# Patient Record
Sex: MALE | Race: WHITE
[De-identification: names, ages, dates, MRNs, and addresses within clinical notes are randomized per-mention and may not be internally consistent; named-entity substitution may affect disease eponyms.]

---

## 2018-10-30 ENCOUNTER — HOSPITAL ENCOUNTER (INPATIENT)
Dept: HOSPITAL 80 - FED | Age: 33
LOS: 6 days | Discharge: HOME | DRG: 897 | End: 2018-11-05
Attending: PSYCHIATRY & NEUROLOGY | Admitting: PSYCHIATRY & NEUROLOGY
Payer: COMMERCIAL

## 2018-10-30 DIAGNOSIS — F17.210: ICD-10-CM

## 2018-10-30 DIAGNOSIS — F15.950: ICD-10-CM

## 2018-10-30 DIAGNOSIS — F20.9: ICD-10-CM

## 2018-10-30 DIAGNOSIS — F60.2: ICD-10-CM

## 2018-10-30 DIAGNOSIS — F15.951: Primary | ICD-10-CM

## 2018-10-30 LAB — PLATELET # BLD: 232 10^3/UL (ref 150–400)

## 2018-10-30 PROCEDURE — G0480 DRUG TEST DEF 1-7 CLASSES: HCPCS
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
## 2018-10-31 NOTE — ASMTLCPROG
Notes

 

Note:                         

Notes:

CIS report and [place] history faxed to [facility]. 

Diagnosis: 

Other Specified Schizophrenia Spectrum and Other Psychotic Disorder  298.8  (F28)

Delusional Disorder, Persecutory type  297.1 (F22)

Alcohol Use Disorder, moderate  303.90  (F10.20)

Amphetamine-Type Substance Use Disorder, moderate  304.40  (F15.20)

Antisocial Personality Disorder  301.7 (F60.2) 

 

Date Signed:  10/31/2018 09:23 AM

Electronically Signed By:Ulises Rendon

## 2018-10-31 NOTE — ASMTTCLDSP
[facility] Discharge Disposition

 

Disposition:                  Answers:  Admit                                 

Disposition Notes:            

Notes:

Admit [facility].

Discharge                     

Concerns/Recommendations:     

Notes:

In consultation with [facility] ED physician, Tucker Torres MD and on-call psychiatrist, Christopher Don MD, both concurred that pt appears to meet 27-65 criteria requiring psychiatric 

hospitalization as pt appears to be at risk of harm to others and gravely disabled due to a mental 

illness condition. Pt was given but did not sign the prohibited belongings checklist.

Was patient given the         Answers:  Yes                                   

Inpatient Behavioral                                                          

Health Prohibited                                                             

Belongings List while in                                                      

the ED?                                                                       

For inpatient                 Christopher Don MD

admission, the following      

psychiatrist agreed to        

accept patient for            

admission to Behavioral       

Health ([facility]):              

Type of Hold:                 Answers:  M1/72-hour Hold                       

Hold initiated by:            Answers:  Other                         Notes:  [facility] clinician

 

Date Signed:  10/31/2018 09:21 AM

Electronically Signed By:Ulises Rendon
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
## 2018-11-01 RX ADMIN — OLANZAPINE SCH MG: 5 TABLET, ORALLY DISINTEGRATING ORAL at 21:40

## 2018-11-01 NOTE — PDMN
Medical Necessity


Medical necessity: Pt meets inpt criteria per MD order and MCG B-[id number], 

Schizophrenia Spectrum Disorders, Adult: Inpatient Care. Pt admitted on M1 Hold 

due to being gravely disabled, admitted w/unspecified schizophrenia or other 

psychotic disorder, antisocial personality disorder, stimulant use disorder-

severe, stimulant induced psychotic disorder. Pt requiring inpt psychiatric 

hospitalization for management of above.

## 2018-11-01 NOTE — SOAPPROG
SOAP Progress Note


Assessment/Plan: 


Assessment:





Unspecified psychosis, Stimulant Use Disorder, Stimulant Induced Psychosis, 

Stimulant Use Withdrawal, Antisocial Personality Disorder, Hx of medication non-

adherence.  Improvement noted. (see subjective/objective note).  Consider 

discharge tomorrow (Friday). 








Plan:





1. Psychotropic medications: After reviewing options, risks, and benefits 

patient agrees to continue current medications with following changes: Zyprexa 

Zydis 5 mg po QHS.  No other medication changes at this time as more time is 

needed to determine ongoing tolerability and efficacy.  Plan is to continue to 

observe patient for response and side effects from medications, and ongoing 

monitoring and evaluation.  


2. Review with patient informed consent and recommendations for psychotropic 

medication treatment listed below


3. Labs: no additional labs at this time


4. Therapy: continue milieu and group therapy  


5. Further investigation including gathering information from patients 

relatives and review of past case records to inform treatment plan.


6. Safety/Wellness plan and follow-up outpatient appointments to be established 

prior to discharge.  Next steps are for patient to meet with care manager to 

plan a safe discharge plan and establish outpatient services for ongoing 

treatment.  


7. Confer with inpatient treatment team regarding treatment plan.  


8. Psychosocial stressors addressed through case management


9. Legal status: M1


10. Consider discharge on Friday if patient is in stable condition, safe, and 

has a safe discharge plan.   


11. Substance abuse interventions: stimulant abuse








PSYCHOTROPIC MEDICATION TREATMENT INFORMED CONSENT and RECOMMENDATIONS: Review 

nature of condition, diagnosis, and prognosis.  Review nature and purpose of 

psychotropic medication treatment.  Review type of psychotropic medications 

being ordered.  Review risk and benefits of psychotropic medication treatment.  

Review probable length of time patient will need to take medications.  Review 

risk and benefits of not undergoing psychotropic medication treatment.  Review 

alternative treatments to psychotropic medications.  Review psychotropic 

medications contraindications, drug-drug interactions, side effects, and 

importance of reporting any side effects to a psychiatric provider or nurse 

during inpatient hospitalization, and upon discharge to patients psychiatric 

outpatient provider, primary care provider, or other health care professional.  

Review importance of asking a nurse, psychiatric provider, or primary care 

provider any questions or problems concerning the psychotropic medications.  

Verify patient understands the information that has been provided, and 

understands, accepts, and agrees to psychotropic medications.  





Review patients safety plan and importance of patient to report to staff while 

hospitalized if patient is ever a danger to self/others, or unable to care for 

self, and upon discharge, the importance for patient to contact [facility] or [facility], or go to the nearest emergency room, if patient is ever a 

danger to self/others, or unable to care for self.  Recommend that upon 

discharge patient establish medication management treatment with a psychiatric 

provider, establishes routine therapy appointments, and follow-up with primary 

care provider.  Verify patient understands and agrees to these recommendations.





11/01/18 11:00





Subjective: 


Following up with patient for evaluation of psychosis and safety.  Patient 

reports, "Feeling better since been here."  Patient expresses the following 

psychiatric symptoms none, reports feeling "really tired."  Patient reports no 

paranoia, no A/V hallucinations.  Patient reports taking "several Adderall days 

before" admission.  Patient reports taking medications as prescribed, and 

describes response to medications as good.  Patient reports not taking Zyprexa 

10 mg po QHS as prescribed for several months due to over sedation side effect.

  Patient requests Zyprexa Zydis 5 mg po QHS due to being over sedated upon 

awakening at 10 mg po QHS.   Patient does not report undesirable side effects 

from the medications, and agrees to continue current medications.  Patient 

describes getting 8 hours of sleep.  Patient reports he is unsure if he will 

return to his sister's home after discharging from hospital, and reports he 

plans to contact her today and make plans for discharge.  





Objective: 





 Vital Signs











Temp Pulse Resp BP Pulse Ox


 


 36.9 C   79   16   142/89 H  95 


 


 11/01/18 06:00  11/01/18 06:00  11/01/18 06:00  11/01/18 06:00  11/01/18 06:00








NURSING REPORT: Consulted with nursing for update on patients progress in 

treatment.  Nurses report patient is engaged in treatment, is not attending 

groups, slept 10 hours last night and slept majority of the day yesterday after 

admission; spends majority of the time in his room in bed sleeping; expresses 

the following psychiatric symptoms: moderate anxiety, exhibits the following 

psychiatric symptoms: withdrawn to room, flat affect; is eating all meals, is 

agreeable to medications and taking as prescribed with no report of side effects

, with no s/s of EPS/akathisia, and denies SI/HI, denies A/V hallucinations, 

and denies delusions.





CASE COORDINATOR UPDATE: establish appointments at [facility] for medication 

management and therapy.





TREATMENT TEAM: Patient met with treatment team to review treatment plan and 

goals for hospitalization.  Patient requests to discharge tomorrow.  Patient to 

contact family regarding place to stay after discharge.  Treatment team to 

follow-up with patient tomorrow regarding disposition after discharge.





MSE: The patient presents casually dressed and with good hygiene, and looks 

stated age.  Patient is sitting, posture is upright, and position is relaxed.  

Patient appears awake, alert, and responds appropriately and reasonably during 

interview.  Patient is engaged, relates well to interviewer, and emotional 

facial expression is appropriate to situation and changes appropriately with 

topic.  Patient is cooperative, makes comfortable eye contact, and movements 

are voluntary, deliberate, coordinated, and smooth and even with no 

inappropriate movements.  Patient makes laryngeal sounds effortlessly and 

shares conversation appropriately; pace of conversation is appropriate, and 

stream of talking is fluent; articulation is clear and understandable; word 

choice is effortless and appropriate for education level; completes sentences, 

occasionally pausing to think; rate and volume are appropriate for interview 

and setting.  Patient reports mood as euthymic.  Patients affect is stable with 

full variable range, congruent with mood, and appropriate to speech and 

circumstances.  Patient has linear and logical thinking, with no loose 

associations, tangential thought, thought blocking, concrete thinking, or any 

other signs of formal thought disorder.  Patient denies suicidal and homicidal 

ideation, and denies hallucinations and delusions.  Patient appears to be a 

reliable historian with sound judgement and good insight into current 

condition.  Patient has no apparent dysfunction in recent or remote memory noted

, and no evidence of gross cognitive dysfunction noted at any point during the 

interview.





SUBSTANCE ABUSE BRIEF INTERVENTION: Brief intervention regarding the risks of 

stimulant abuse is provided to patient with goal to reduce the risk of harm 

that could result from the continued abuse of stimulants, with the general aim 

to investigate the problem, raise awareness of problem, develop a solution with 

the patient, recommend a specific change or activity, and motivate the patient 

toward change.  Assess substance abuse behavior and give supportive advice 

about harm reduction, recommend a reduction in hazardous/at-risk consumption 

patterns, and facilitate referrals for additional specialized treatment with 

care coordinator.  Intermediate goal is for the patient to quit and engage in 

outpatient substance abuse treatment.  Intervention focus on intermediate goals 

to allow for more immediate success in the treatment process to keep the 

patient motivated.  Review following with patient: Stimulant abuse risks: Short-

term: insomnia, irritability, aggressive behavior, hallucinations, delusions, 

intellectual deficits, anxiety, depression, convulsions, damage to blood 

vessels in the brain causing strokes, high fevers, collapse of the circulatory 

system. Long-term: damage to nerve pathways, maybe irreversibly; 

overstimulation to dopamine impairing dopamine transport and reducing 

efficiency of dopamine receptors, the reward system becomes worn out, leading 

to inability to experience pleasure for years.  





OUTPATIENT SUBSTANCE ABUSE TREATMENT: Patient referred to outpatient provider 

and treatment for continued treatment related to substance abuse.











- Time Spent With Patient


Time Spent With Patient: 


30 minutes, met with patient individually and with patient and treatment team.








- Pending Discharge


Pending Discharge Within 24 Hours: Yes


Pending Discharge Within 48 Hours: No


Pending Discharge Date: 11/02/18


Pending Discharge Time: 11:00





ICD10 Worksheet


Patient Problems: 


 Problems











Problem Status Onset


 


Abrasion of right forearm, initial encounter Acute  


 


Acute psychosis Acute  


 


Antisocial personality disorder Acute  


 


Stimulant use disorder Acute  


 


Stimulant-induced psychotic disorder with delusions Acute  


 


Unspecified psychosis Acute

## 2018-11-01 NOTE — ASMTBHMTP
Master Treatment Plan

 

Master Treatment Plan         Answers:  Impaired Reality                      

for:                                                                          

Date:                         11/01/2018

Diagnosis on Admission:       Other Specified Schizophrenia Spectrum and Other 

                              Psychotic Disorder

Expected length of stay:      3-5 Days

Reason for admission:         

Notes:

Per [facility] Evaluation - Pt. is a 33 year old, ,  ([place] [facility]. Ct. was living with [facility] [place] and then [facility] until today but given his decompensation 


due to being off his antipsychotics for 2 months and Zoloft for a year, MO reported ct. is not 

welcome in SOC's or [facility] homes and is homeless. SOC and MO called [facility] requesting a mobile 

response today b/c ct has been off his antipsychotics for 2 months and Zoloft for a year and taking 


1/15th of the daily amount of Wellbutrin prescribed and ct is paranoia reporting AH to SOC, and 

threatening others with knives and hiding knives in furniture and carrying them with him. 

Patient's stated              

presenting problems:          

Notes:

Mom called crisis worked and I was put on a hold, then brought to the hospital.

Patient's goals for           

treatment:                    

Notes:

Try to start a schedule. Maybe read or exercise

Patient's strengths:          

Notes:

Don't know. Staying patient, staying calm

Identify supports outside     

of hospital:                  

Notes:

Don't really have one right now

Discharge criteria:           

Notes:

Psychotic symptoms will be reduced or eliminated with return to baseline functioning in 

affect, thinking, and behavior prior to discharge. 

Initial disposition           

plan/considerations:          

Notes:

Not sure where will be living

Master Treatment Plan Required Signatures

 

Psychiatrist signature:       Answers:  Psychiatrist: ______________________________

RN on-shift signature:        Answers:  RN: ____________________________________

Patient signature:            Answers:  Patient: ____________________________________

 

Date Signed:  11/01/2018 09:22 AM

Electronically Signed By:Tiffanie Cheney

## 2018-11-01 NOTE — ASMTCMCOM
CM Note

 

CM Note                       

Notes:

Pt. and CC completed MTP and placed in chart. 



Pt. reports no current legal issues. Pt. reports drinking alcohol three times a week, having two 

drinks per sitting. Pt. reports smoking cigarettes but is unsure if he wants to quit smoking at 

this time. Pt. denied all other substance use. Pt. reports this being his first mental health 

hospitalization. Pt. denied SI, HI, AVH and paranoia. 



Pt. presents as alert, calm, good eye contact, somewhat passive, and with a mostly friendly 

demeanor. Staff report pt. sleeping throughout most of yesterday and the night. Staff report pt. is 


medication complaint. 

 

Date Signed:  11/01/2018 12:56 PM

Electronically Signed By:Tiffanie Cheney

## 2018-11-02 RX ADMIN — BACITRACIN ZINC SCH APP: 500 OINTMENT TOPICAL at 21:11

## 2018-11-02 RX ADMIN — OLANZAPINE SCH MG: 5 TABLET, ORALLY DISINTEGRATING ORAL at 20:37

## 2018-11-02 RX ADMIN — BACITRACIN ZINC SCH APP: 500 OINTMENT TOPICAL at 12:37

## 2018-11-02 NOTE — SOAPPROG
SOAP Progress Note


Assessment/Plan: 


Assessment:





Unspecified psychosis, Stimulant Use Disorder, Stimulant Induced Psychosis, 

Antisocial Personality Disorder, Hx of medication non-adherence.  

Decompensation noted; patient reports paranoid delusions regarding people being 

after him (see subjective/objective note).  Patient requires continued 

inpatient care because of current paranoia, and requires inpatient level of 

care to stabilize in order to no longer be gravely disabled due to mental 

illness or danger to others due to paranoid delusions and his expressed need to 

carry knife for protection due to people being after him.  Patient could 

benefit from continued inpatient hospitalization for crisis stabilization, 

safety, and medication evaluation.  








Plan:





1. Psychotropic medications: After reviewing options, risks, and benefits 

patient agrees to continue current medications: Zyprexa Zydis 5 mg po QHS.  No 

other medication changes at this time as more time is needed to determine 

ongoing tolerability and efficacy.  Plan is to continue to observe patient for 

response and side effects from medications, and ongoing monitoring and 

evaluation.  


2. Review with patient informed consent and recommendations for psychotropic 

medication treatment listed below


3. Labs: no additional labs at this time


4. Therapy: continue milieu and group therapy  


5. Further investigation including gathering information from patients 

relatives and review of past case records to inform treatment plan.


6. Safety/Wellness plan and follow-up outpatient appointments to be established 

prior to discharge.  Next steps are for patient to meet with care manager to 

plan a safe discharge plan and establish outpatient services for ongoing 

treatment.  


7. Confer with inpatient treatment team regarding treatment plan.  


8. Psychosocial stressors addressed through case management


9. Legal status: VOL


10. Consider discharge on Monday if patient is in stable condition, safe, and 

has a safe discharge plan.   


11. Substance abuse interventions: stimulant abuse








PSYCHOTROPIC MEDICATION TREATMENT INFORMED CONSENT and RECOMMENDATIONS: Review 

nature of condition, diagnosis, and prognosis.  Review nature and purpose of 

psychotropic medication treatment.  Review type of psychotropic medications 

being ordered.  Review risk and benefits of psychotropic medication treatment.  

Review probable length of time patient will need to take medications.  Review 

risk and benefits of not undergoing psychotropic medication treatment.  Review 

alternative treatments to psychotropic medications.  Review psychotropic 

medications contraindications, drug-drug interactions, side effects, and 

importance of reporting any side effects to a psychiatric provider or nurse 

during inpatient hospitalization, and upon discharge to patients psychiatric 

outpatient provider, primary care provider, or other health care professional.  

Review importance of asking a nurse, psychiatric provider, or primary care 

provider any questions or problems concerning the psychotropic medications.  

Verify patient understands the information that has been provided, and 

understands, accepts, and agrees to psychotropic medications.  





Review patients safety plan and importance of patient to report to staff while 

hospitalized if patient is ever a danger to self/others, or unable to care for 

self, and upon discharge, the importance for patient to contact [facility] or [facility], or go to the nearest emergency room, if patient is ever a 

danger to self/others, or unable to care for self.  Recommend that upon 

discharge patient establish medication management treatment with a psychiatric 

provider, establishes routine therapy appointments, and follow-up with primary 

care provider.  Verify patient understands and agrees to these recommendations.





11/02/18 15:29





Subjective: 


Following up with patient for evaluation of psychosis and safety.  Patient 

reports, "Continue to feel better, not as sedated in the morning with lower 

dose of medication before bed."  Patient expresses the following psychiatric 

symptoms paranoid delusions.  Patient reports paranoia, "People may be after me

, that is why I was carrying knife around, for protection", reports no A/V 

hallucinations.  Patient reports feeling safe here.  Patient does not report 

undesirable side effects from the medications, and agrees to continue current 

medications.  Patient describes getting 8 hours of sleep.  Patient reports plan 

to return to his mothers home on Monday, and reports his mother agrees with 

this plan.  Patient expresses that he is not interested in [facility], and reports he 

will take medications as prescribed after discharging from hospital.   





Objective: 





 Vital Signs











Temp Pulse Resp BP Pulse Ox


 


 36.9 C   87   16   168/95 H  96 


 


 11/02/18 06:00  11/02/18 06:00  11/02/18 06:00  11/02/18 06:00  11/02/18 06:00








NURSING REPORT: Consulted with nursing for update on patients progress in 

treatment.  Nurses report patient is not engaged in treatment, is not attending 

groups, slept 10 hours last night and slept majority of the day yesterday after 

admission; spends majority of the time in his room in bed sleeping; expresses 

the following psychiatric symptoms: moderate anxiety, exhibits the following 

psychiatric symptoms: withdrawn to room, flat affect; is eating all meals, is 

agreeable to medications and taking as prescribed with no report of side effects

, with no s/s of EPS/akathisia, and denies SI/HI, denies A/V hallucinations, 

and reports paranoid delusions of people being after him prior to his admission 

and the need to carry a knife to protect himself prior to admission.





CASE COORDINATOR UPDATE: establish appointments at [facility] for medication 

management and therapy.





TREATMENT TEAM: Patient met with treatment team to review treatment plan and 

goals for hospitalization.  Patient requests to discharge Monday, reports he 

feels like he needs a few more days to stabilize.  Patient reports to treatment 

team he felt as though he needed to carry knife for protection due to being 

concerned people were after him prior to his admission.  Patient reports he is 

unsure if people are still after him.





FAMILY MEETING: Patient mother reports concern for patient returning home too 

early and not being stable on medications.  She agrees to patient returning 

home on Monday if patient continues to be adherent to medications, is stable, 

safe, and has a safe discharge plan with follow-up appointments.





MSE: The patient presents casually dressed and with good hygiene, and looks 

stated age.  Patient is sitting, posture is upright, and position is relaxed.  

Patient appears awake, alert, and responds appropriately and reasonably during 

interview.  Patient is engaged, relates well to interviewer, and emotional 

facial expression is appropriate to situation and changes appropriately with 

topic.  Patient is cooperative, makes comfortable eye contact, and movements 

are voluntary, deliberate, coordinated, and smooth and even with no 

inappropriate movements.  Patient makes laryngeal sounds effortlessly and 

shares conversation appropriately; pace of conversation is appropriate, and 

stream of talking is fluent; articulation is clear and understandable; word 

choice is effortless and appropriate for education level; completes sentences, 

occasionally pausing to think; rate and volume are appropriate for interview 

and setting.  Patient reports mood as euthymic.  Patients affect is stable with 

full variable range, congruent with mood, and appropriate to speech and 

circumstances.  Patient has linear and logical thinking, with no loose 

associations, tangential thought, thought blocking, concrete thinking, or any 

other signs of formal thought disorder.  Patient denies suicidal and homicidal 

ideation, and denies hallucinations; patient reports paranoid delusions 

regarding people being after him.  Patient appears to be a poor historian with 

poor judgement and poor insight into current condition.  Patient has no 

apparent dysfunction in recent or remote memory noted, and no evidence of gross 

cognitive dysfunction noted at any point during the interview.





SUBSTANCE ABUSE BRIEF INTERVENTION: Brief intervention regarding the risks of 

stimulant abuse is provided to patient with goal to reduce the risk of harm 

that could result from the continued abuse of stimulants, with the general aim 

to investigate the problem, raise awareness of problem, develop a solution with 

the patient, recommend a specific change or activity, and motivate the patient 

toward change.  Assess substance abuse behavior and give supportive advice 

about harm reduction, recommend a reduction in hazardous/at-risk consumption 

patterns, and facilitate referrals for additional specialized treatment with 

care coordinator.  Intermediate goal is for the patient to quit and engage in 

outpatient substance abuse treatment.  Intervention focus on intermediate goals 

to allow for more immediate success in the treatment process to keep the 

patient motivated.  Review following with patient: Stimulant abuse risks: Short-

term: insomnia, irritability, aggressive behavior, hallucinations, delusions, 

intellectual deficits, anxiety, depression, convulsions, damage to blood 

vessels in the brain causing strokes, high fevers, collapse of the circulatory 

system. Long-term: damage to nerve pathways, maybe irreversibly; 

overstimulation to dopamine impairing dopamine transport and reducing 

efficiency of dopamine receptors, the reward system becomes worn out, leading 

to inability to experience pleasure for years.  





OUTPATIENT SUBSTANCE ABUSE TREATMENT: Patient referred to outpatient provider 

and treatment for continued treatment related to substance abuse.














- Time Spent With Patient


Time Spent With Patient: 


30 minutes, patient met with this NP individually and with NP and treatment 

team to review treatment plan and goals for hospitalization.








- Pending Discharge


Pending Discharge Within 24 Hours: No


Pending Discharge Within 48 Hours: No





ICD10 Worksheet


Patient Problems: 


 Problems











Problem Status Onset


 


Abrasion of right forearm, initial encounter Acute  


 


Acute psychosis Acute  


 


Antisocial personality disorder Acute  


 


Stimulant use disorder Acute  


 


Stimulant-induced psychotic disorder with delusions Acute  


 


Unspecified psychosis Acute

## 2018-11-02 NOTE — ASMTCMCOM
CM Note

 

CM Note                       

Notes:

Pt appears to be close to baseline denies si/hi ah/vh stabilizing has some anxiety in judgement and 


insight.  Agreed to sign SIVAKUMAR with family in tx planning meeting now denies.  Left SIVAKUMAR fo rhim to 

think about over the weekend and sign.  He notes sleeping better with medication assistance.  He 

appears aaox4 but presents with some noticeable anxiety regarding family contact. [facility]

 

Date Signed:  11/02/2018 12:31 PM

Electronically Signed By:Leona Wiggins

## 2018-11-03 RX ADMIN — BACITRACIN ZINC SCH APP: 500 OINTMENT TOPICAL at 09:01

## 2018-11-03 RX ADMIN — OLANZAPINE SCH MG: 5 TABLET, ORALLY DISINTEGRATING ORAL at 20:01

## 2018-11-03 RX ADMIN — DOXYCYCLINE HYCLATE SCH MG: 100 CAPSULE, GELATIN COATED ORAL at 09:01

## 2018-11-03 RX ADMIN — BACITRACIN ZINC SCH APP: 500 OINTMENT TOPICAL at 20:01
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
## 2018-11-04 RX ADMIN — BACITRACIN ZINC SCH APP: 500 OINTMENT TOPICAL at 20:11

## 2018-11-04 RX ADMIN — DOXYCYCLINE HYCLATE SCH MG: 100 CAPSULE, GELATIN COATED ORAL at 09:08

## 2018-11-04 RX ADMIN — OLANZAPINE SCH MG: 5 TABLET, ORALLY DISINTEGRATING ORAL at 20:11

## 2018-11-04 RX ADMIN — BACITRACIN ZINC SCH APP: 500 OINTMENT TOPICAL at 09:08
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
## 2018-11-05 VITALS — DIASTOLIC BLOOD PRESSURE: 87 MMHG | SYSTOLIC BLOOD PRESSURE: 130 MMHG

## 2018-11-05 RX ADMIN — DOXYCYCLINE HYCLATE SCH MG: 100 CAPSULE, GELATIN COATED ORAL at 09:02

## 2018-11-05 RX ADMIN — BACITRACIN ZINC SCH APP: 500 OINTMENT TOPICAL at 09:02

## 2018-11-05 NOTE — ASMTBHDC
Notes

 

Note:                         

Notes:

CC was able to confirm client's discharge follow up appts with [facility]:







Follow up with:



[facility]

[address]

[place] [address]

** Next appointment: Monday November 5th, 2018 (11/5/18) at 2:00pm, check in at 1:45pm at [address] 

[facility]



[facility] ([facility])

[facility]

[address] [place] [address]

Ph: [phone]

** Next Appointment: Wednesday Novermber 7th, 2018 (11/7/18) at 2:00pm, check in at 1:45pm with 

Dr. Tavera**

 

Date Signed:  11/05/2018 08:22 AM

Electronically Signed By:Kike Osuna

## 2018-11-05 NOTE — BDS
REASON FOR ADMISSION:  From the ED note dated 10/30/2018, the patient presented 
by EMS on an M1 hold by [facility] for being gravely disabled.  The 
patient has a history of schizophrenia, delusional disorder, antisocial 
personality disorder, and has been off his medications for approximately 2 
months.  The patient reported having paranoid delusions and carrying around a 
kitchen knife for protection.  His family was concerned regarding his paranoia 
and carrying the knife for protection.  Patient admitted to Adderall use over 
the past few days prior to presenting to the emergency department.  The patient 
denied methamphetamine use.  Patient was admitted involuntarily on an M1 hold 
due to being gravely disabled due to a mental illness.  The patient was 
admitted for safety, crisis stabilization, and medication management.



ADMITTING DIAGNOSES:  

1.  Unspecified psychosis.

2.  Stimulant-induced psychotic disorder with delusions.  

3.  Stimulant use disorder.  

4.  Acute psychosis.  

5.  Antisocial personality disorder.



ADMISSION PHYSICAL EXAM:  The patient was seen on 10/31/2018, for an internal 
medicine consultation note.  The consultation was for medical clearance for 
inpatient Behavioral Health stay.  The patient was medically cleared for 
inpatient psychiatric hospitalization, medications and procedures.  Dr. Hunt 
reported he saw no medical contraindications to the patient's continued stay on 
the inpatient behavioral health unit or to any psychiatric medications or 
procedures.  For further details, please refer to Internal Medicine 
consultation note dated 10/31/2018.



ADMISSION LABS:  CBC was completely within normal limits.  Serum chemistry 
revealed a low carbon dioxide at 20.  Otherwise, renal function and 
electrolytes were normal.  Hemoglobin A1c from 10/30/2018, was 5.4, within 
normal limits.  Liver function from 10/30/2018, within normal limits, except 
total protein was low at 5.9, and albumin was low at 3.4.  Lipid panel from 10/
30/2018, within normal limits, except cholesterol was low at 97. LDL 
cholesterol was low at 34.  Non-HDL cholesterol was low at 48.  LDL/HDL ratio 
was low at 0.69. Toxicology screen from 10/30/2018, non-negative for amphetamine
, negative for all other substances of abuse screened, and negative for ethyl 
alcohol.



MAJOR PROCEDURES OR TESTS:  None.



HOSPITAL COURSE:  The most prominent symptoms and behaviors while the patient 
was here were paranoia.  Upon admission, the patient continued to report that 
he felt like "people" were after him, and he would need to carry some type of 
weapon to protect himself.  The patient reported that he stopped taking Zyprexa 
10 mg p.o. q.h.s. due to feeling over-sedated in the morning and requested a 
lower dose. Treatment modalities utilized were milieu and group therapy.  
Zyprexa Zydis 5 mg p.o. q.h.s. was started to target psychosis symptoms, 
notably paranoia.  Medication was tolerated with no report of side effects and 
with good response.  The patient has improved considerably, with no signs of 
psychiatric symptoms and no psychiatric symptoms expressed at discharge.  The 
patient reports he no longer feels as though people are after him and states he 
no longer feels as though he needs to protect himself, such as he was before 
admission by carrying a knife.  The patient reports he has improved since 
admission, states to be in stable condition, feels safe to discharge, and he 
contracts for safety.  Patient's response to treatment was good.  There were no 
adverse or unexpected results of treatment.  The patient was safe throughout 
his stay, active in treatment, engaged in groups, and was appropriate with 
staff and other patients.  The patient met with the treatment team prior to 
discharge to assess readiness to discharge and review discharge plans.  The 
treatment team consensus is the patient is in stable condition, has a safe 
discharge plan, and is ready to discharge today.



CONDITION ON DISCHARGE: Patient is in stable condition and is no longer a 
danger to self or others, and is not gravely disabled due to mental illness.  
Patient is no longer in need of inpatient level of care, and can be safely and 
effectively treated within the community.  The patients level of risk at time 
of discharge is low.  MSE: The patient is casually dressed and with good hygiene
, and looks stated age.  Patient is sitting, posture is upright, and position 
is relaxed.  Patient appears awake, alert, and responds appropriately and 
reasonably during interview.  Patient is engaged, relates well to interviewer, 
and emotional facial expression is appropriate to situation and changes 
appropriately with topic.  Patient is cooperative, makes comfortable eye contact
, and movements are voluntary, deliberate, coordinated, and smooth and even 
with no inappropriate movements.  Patient makes laryngeal sounds effortlessly 
and shares conversation appropriately; pace of conversation is appropriate, and 
stream of talking is fluent; articulation is clear and understandable; word 
choice is effortless and appropriate for education level; completes sentences, 
occasionally pausing to think; rate and volume are appropriate for interview 
and setting.  Patient reports mood as euthymic.  Patients affect is stable with 
full variable range, congruent with mood, and appropriate to speech and 
circumstances.  Patient has linear and logical thinking, with no loose 
associations, tangential thought, thought blocking, concrete thinking, or any 
other signs of formal thought disorder.  Patient denies suicidal and homicidal 
ideation, and denies hallucinations and delusions.  Patient appears to be a 
reliable historian with sound judgement and good insight into current 
condition.  Patient has no apparent dysfunction in recent or remote memory noted
, and no evidence of gross cognitive dysfunction noted at any point during the 
interview. 



DISCHARGE DIAGNOSES:  

1.  Unspecified psychosis.

2.  Stimulant-induced psychotic disorder with delusions.  

3.  Stimulant use disorder.  

4.  Acute psychosis.  

5.  Antisocial personality disorder.



CURRENT MEDICATIONS:  After reviewing options, risks and benefits with the 
patient, the patient agrees to continue:

1.  Zyprexa 5 mg p.o. q.h.s. for psychosis.

2.  Bacitracin ointment 1 application topical twice a day for 7 days, or as 
indicated, for wound on forearm.

3.  Doxycycline hyclate 100 mg p.o. daily for 4 days.  



The patient requests prescriptions for these medications at time of discharge.  
Prescriptions are provided.  A prescription for Zyprexa 5 mg p.o. q.h.s. for 30 
days; bacitracin ointment, prescription is provided for 7 days; and doxycycline 
hyclate is provided for 4 days.  Prescriptions are reviewed with the patient at 
time of discharge to ensure accuracy and patient understanding.  Patient to 
[facility] for continued psychotropic medication 
evaluation and management, and patient is to follow up at [facility] for ongoing 
wound care and management of current medications for wound.



DISPOSITION:  Patient left hospital independently and voluntarily and plans to 
return home and stay with his mother.  His mother agrees with this plan and 
looks forward to the patient returning home.



FOLLOWUP:  Care coordinator reports the appropriate outpatient follow-up 
services have been established and outpatient appointments have been scheduled.
  The patient received written instructions with times and dates of outpatient 
follow-up appointments.  The following follow-up recommendations were provided 
to the patient at discharge: Continue psychotropic medications as prescribed 
and attend appointments as scheduled.  Report any side effects to a psychiatric 
outpatient provider, a primary care provider, or other health care 
professional.  Address any questions or problems concerning the psychotropic 
medications with a psychiatric outpatient provider, a primary care provider, or 
other health care professional.  Contact [facility] or [facility], or go 
to the nearest emergency room, if you are ever a danger to yourself/others, or 
unable to care for yourself.  As soon as possible, establish a routine 
medication management treatment with a psychiatric provider, establish routine 
therapy appointments, and follow-up with a primary care provider.  



SUBSTANCE ABUSE BRIEF INTERVENTION: Brief intervention regarding the risks of 
stimulant abuse is provided to patient with goal to reduce the risk of harm 
that could result from the continued abuse of stimulants, with the general aim 
to investigate the problem, raise awareness of problem, develop a solution with 
the patient, recommend a specific change or activity, and motivate the patient 
toward change.  Assess substance abuse behavior and give supportive advice 
about harm reduction, recommend a reduction in hazardous/at-risk consumption 
patterns, and facilitate referrals for additional specialized treatment with 
care coordinator.  Intermediate goal is for the patient to quit and engage in 
outpatient substance abuse treatment.  Intervention focus on intermediate goals 
to allow for more immediate success in the treatment process to keep the 
patient motivated.  Review following with patient: Stimulant abuse risks: Short-
term: insomnia, irritability, aggressive behavior, hallucinations, delusions, 
intellectual deficits, anxiety, depression, convulsions, damage to blood 
vessels in the brain causing strokes, high fevers, collapse of the circulatory 
system. Long-term: damage to nerve pathways, maybe irreversibly; 
overstimulation to dopamine impairing dopamine transport and reducing 
efficiency of dopamine receptors, the reward system becomes worn out, leading 
to inability to experience pleasure for years.  



OUTPATIENT SUBSTANCE ABUSE TREATMENT: Patient referred to outpatient provider 
and treatment for continued treatment related to substance abuse.



LEGAL COURSE:  The patient was admitted on an M1 hold for involuntary inpatient 
psychiatric hospitalization.  Patient discharged today independently and 
voluntarily.



ATTITUDE AT TIME OF DISCHARGE:  The patients attitude was positive at time of 
discharge, and patient reports looking forward to discharging today.  The 
patient reports he feels safe to discharge, is no longer a danger to himself or 
others, is in stable condition, and contracts for safety.  Patient states he 
will continue medications as prescribed, and establish medication management 
treatment with an outpatient provider after discharge.  Patient reports he 
understands the information that has been provided to him, and he understands, 
accepts, and agrees to psychotropic medications.  Patient describes internal 
protective factors as the coping skills he has learned while hospitalized here, 
and he plans to continue to practice these coping skills after discharge.  



LABS AND STUDIES:  There were no pending labs or studies at time of discharge.



ADVANCE DIRECTIVES:  There were no advance directives on file, and patient was 
full code during this hospitalization.



The following psychotropic medication treatment informed consent and 
recommendations were provided to the patient at time of discharge.  Patient 
reports he understands, accepts, and agrees to the information that has been 
provided.   



PSYCHOTROPIC MEDICATION TREATMENT INFORMED CONSENT and RECOMMENDATIONS: Review 
nature of condition, diagnosis, and prognosis.  Review nature and purpose of 
psychotropic medication treatment. Review type of psychotropic medications 
being prescribed.  Review risk and benefits of psychotropic medication 
treatment.  Review probable length of time will need to take medications.  
Review risk and benefits of not undergoing psychotropic medication treatment.  
Review alternative treatments to psychotropic medications.  Review psychotropic 
medications contraindications, side effects, and importance of reporting any 
side effects to a psychiatric provider, primary care provider, or other health 
care professional.  Review importance of asking a psychiatric provider or 
primary care provider any questions or problems concerning the psychotropic 
medications.  



Review safety plan and the importance to contact [facility] or [facility]
, or go to the nearest emergency room, if ever a danger to yourself/others, or 
unable to care for yourself.  Recommend upon discharge to establish routine 
medication management treatment with a psychiatric provider, establish routine 
therapy appointments, and follow-up with a primary care provider.  Verify 
patient understands, accepts, and agrees to the information that has been 
provided. 



Job #:  [id number]

MTDD

## 2020-08-21 NOTE — ASMTBHDC
Notes

 

Note:                         

Notes:

Patient to dc to home  w/ family on monday he has f/u appt with [facility] on Thurs Nov 8 at 

300 pm .  His f/u is with [facility] Therapist Opal Rascon  11/5 at 1pm, check in at 12:45 pm, at [address]  [address] floor (building just [address]

Dr. Tavera 11/7 at 2 p.m., [facility] [address] [facility]

 

Date Signed:  11/02/2018 12:28 PM

Electronically Signed By:Leona Wiggins Walk in